# Patient Record
Sex: FEMALE | Race: WHITE | Employment: OTHER | URBAN - METROPOLITAN AREA
[De-identification: names, ages, dates, MRNs, and addresses within clinical notes are randomized per-mention and may not be internally consistent; named-entity substitution may affect disease eponyms.]

---

## 2021-10-25 DIAGNOSIS — E55.9 VITAMIN D DEFICIENCY DISEASE: ICD-10-CM

## 2021-10-25 DIAGNOSIS — E78.5 HYPERLIPIDEMIA, UNSPECIFIED HYPERLIPIDEMIA TYPE: ICD-10-CM

## 2021-10-25 DIAGNOSIS — U07.1 COVID: ICD-10-CM

## 2021-10-25 DIAGNOSIS — I48.91 ATRIAL FIBRILLATION, NEW ONSET (HCC): Primary | ICD-10-CM

## 2021-11-09 DIAGNOSIS — G47.00 INSOMNIA, UNSPECIFIED TYPE: Primary | ICD-10-CM

## 2021-11-09 RX ORDER — ZOLPIDEM TARTRATE 10 MG/1
5 TABLET ORAL NIGHTLY PRN
COMMUNITY

## 2021-12-06 DIAGNOSIS — I48.91 ATRIAL FIBRILLATION, NEW ONSET (HCC): ICD-10-CM

## 2021-12-06 DIAGNOSIS — E78.5 HYPERLIPIDEMIA, UNSPECIFIED HYPERLIPIDEMIA TYPE: ICD-10-CM

## 2021-12-06 DIAGNOSIS — E55.9 VITAMIN D DEFICIENCY DISEASE: ICD-10-CM

## 2021-12-06 DIAGNOSIS — U07.1 COVID: ICD-10-CM

## 2021-12-07 ENCOUNTER — OFFICE VISIT (OUTPATIENT)
Dept: CARDIOLOGY CLINIC | Age: 76
End: 2021-12-07
Payer: MEDICARE

## 2021-12-07 VITALS
SYSTOLIC BLOOD PRESSURE: 150 MMHG | WEIGHT: 151 LBS | DIASTOLIC BLOOD PRESSURE: 80 MMHG | OXYGEN SATURATION: 96 % | HEART RATE: 86 BPM

## 2021-12-07 DIAGNOSIS — E78.5 HYPERLIPIDEMIA, UNSPECIFIED HYPERLIPIDEMIA TYPE: ICD-10-CM

## 2021-12-07 DIAGNOSIS — R09.89 BRUIT: Primary | ICD-10-CM

## 2021-12-07 DIAGNOSIS — I10 HYPERTENSION, UNSPECIFIED TYPE: ICD-10-CM

## 2021-12-07 DIAGNOSIS — E55.9 VITAMIN D DEFICIENCY DISEASE: ICD-10-CM

## 2021-12-07 PROBLEM — E83.39 HYPOPHOSPHATEMIA: Status: ACTIVE | Noted: 2021-10-14

## 2021-12-07 PROBLEM — U07.1 COVID-19: Status: ACTIVE | Noted: 2021-10-08

## 2021-12-07 PROBLEM — I48.91 ATRIAL FIBRILLATION (HCC): Status: ACTIVE | Noted: 2021-10-13

## 2021-12-07 PROBLEM — E87.1 HYPONATREMIA: Status: ACTIVE | Noted: 2021-10-14

## 2021-12-07 PROBLEM — E66.9 OBESITY (BMI 30.0-34.9): Status: ACTIVE | Noted: 2019-02-27

## 2021-12-07 PROCEDURE — G8427 DOCREV CUR MEDS BY ELIG CLIN: HCPCS | Performed by: INTERNAL MEDICINE

## 2021-12-07 PROCEDURE — G8484 FLU IMMUNIZE NO ADMIN: HCPCS | Performed by: INTERNAL MEDICINE

## 2021-12-07 PROCEDURE — 99204 OFFICE O/P NEW MOD 45 MIN: CPT | Performed by: INTERNAL MEDICINE

## 2021-12-07 PROCEDURE — 1036F TOBACCO NON-USER: CPT | Performed by: INTERNAL MEDICINE

## 2021-12-07 PROCEDURE — 4040F PNEUMOC VAC/ADMIN/RCVD: CPT | Performed by: INTERNAL MEDICINE

## 2021-12-07 PROCEDURE — G8400 PT W/DXA NO RESULTS DOC: HCPCS | Performed by: INTERNAL MEDICINE

## 2021-12-07 PROCEDURE — 3017F COLORECTAL CA SCREEN DOC REV: CPT | Performed by: INTERNAL MEDICINE

## 2021-12-07 PROCEDURE — 1123F ACP DISCUSS/DSCN MKR DOCD: CPT | Performed by: INTERNAL MEDICINE

## 2021-12-07 PROCEDURE — 1090F PRES/ABSN URINE INCON ASSESS: CPT | Performed by: INTERNAL MEDICINE

## 2021-12-07 PROCEDURE — G8421 BMI NOT CALCULATED: HCPCS | Performed by: INTERNAL MEDICINE

## 2021-12-07 RX ORDER — OMEGA-3/DHA/EPA/FISH OIL 300-1000MG
CAPSULE ORAL DAILY
COMMUNITY

## 2021-12-07 RX ORDER — ACETAMINOPHEN 500 MG
500 TABLET ORAL EVERY 6 HOURS PRN
COMMUNITY

## 2021-12-07 RX ORDER — MULTIVIT WITH MINERALS/LUTEIN
1000 TABLET ORAL DAILY
COMMUNITY

## 2021-12-07 RX ORDER — METOPROLOL SUCCINATE 25 MG/1
12.5 TABLET, EXTENDED RELEASE ORAL DAILY
COMMUNITY
Start: 2021-11-17

## 2021-12-07 RX ORDER — ROSUVASTATIN CALCIUM 5 MG/1
5 TABLET, COATED ORAL DAILY
Qty: 30 TABLET | Refills: 11 | Status: SHIPPED | OUTPATIENT
Start: 2021-12-07 | End: 2022-05-25 | Stop reason: SINTOL

## 2021-12-07 RX ORDER — ROSUVASTATIN CALCIUM 5 MG/1
5 TABLET, COATED ORAL DAILY
COMMUNITY
End: 2021-12-07 | Stop reason: SDUPTHER

## 2021-12-07 NOTE — PROGRESS NOTES
Ov Dr. Amie Sanders for new pt   Referred by Dr. 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

## 2021-12-07 NOTE — PATIENT INSTRUCTIONS
Start taking Metoprolol 25 mg 1/2 tablet daily     Monitor bp/hr daily-different times of the day   Call in 2 weeks with readings     Will set up for carotid U/S    Will ADD Crestor 5 mg one daily     Will recheck Lipids in three months  (will call you with results)    Follow up in 6 month

## 2021-12-12 NOTE — PROGRESS NOTES
Dwaine Desai M.D. [address] [place] [address] [place] [address]  [phone]          2021          Jacquie Salinas, [address] [facility], [address] [place] [address]      RE:   Andrea Elmore  :  1945      Dear Dr. Sandoval Encinas:    CHIEF COMPLAINT:  Atrial fibrillation. HISTORY OF PRESENT ILLNESS:  I had the pleasure of seeing Mrs. Andrea Elmore in our office on 2021. We take care of her , who had an aortic valve replacement via TAVR procedure. Mrs. Efra Rosario has had no cardiac issues. She had a stress test on 2021, that was negative for ischemia. Echocardiogram also on 2021, looked good with ejection fraction of 65% to 70%. She has not seen a cardiologist.    She does have a long history of severe hyperlipidemia. She was seen in January and in May. She had been tried on pravastatin but had decided quit taking this. She went to the emergency room on 10/13/2021. She was having episodes of dizziness with presyncope. She had been diagnosed with pneumonia one week previously and did test positive for COVID on 10/11/2021. She was receiving monoclonal antibody on 10/15, when she developed lightheadedness and was sent to the ER. In the ER, she was in atrial fibrillation, which was a new onset for the patient. She was admitted and in the morning, she spontaneously converted to sinus rhythm. She had an echocardiogram, which still showed normal LV function, recommendations were for Eliquis 5 mg b.i.d. She was also started on metoprolol. She saw Dr. Jagjit Mclean on 10/25/2021. He agreed, of course, with long-term Eliquis. They then followed up with Dr. Sandoval Encinas on . She stopped metoprolol. She wanted to see me in followup for an opinion. She was given metoprolol 25 mg to take one tablet every Monday, Wednesday and Friday. When I see her, she is distressed that she is taking some medications.   She is taking metoprolol a half of the 50 mg three times a week. Her blood pressure at home was in the 130s. She denies any chest pain or chest discomfort. She has had no unusual shortness of breath. She has had no further lightheaded episodes or near-syncopal episodes. She tries to walk on a regular basis. She has never had a myocardial infarction, never had cardiac catheterization. CARDIAC RISK FACTORS:  Hypertension:  Positive. Severe Hyperlipidemia:  Positive. Other Family Members:  Positive. Peripheral Vascular Disease:  Negative. Diabetes:  Negative. MEDICATIONS:  She is on Tylenol p.r.n., Eliquis 5 mg b.i.d., vitamin B12 daily, vitamin C 1000 mg daily, fish oil 1000 mg daily, Toprol-XL 25 mg half a tablet Monday, Wednesday and Friday (was supposed to take a full tablet on Monday, Wednesday and Friday), multivitamins daily, antihistamines p.r.n., vitamin D daily, Ambien 10 mg p.r.n. for sleep. PAST MEDICAL AND SURGICAL HISTORY:  1. Paroxysmal atrial fibrillation. 2.  Hypophosphatemia. 3.  COVID-19 in October. 4.  Tonsillectomy in 1958. 5.  Meniscus repair on 11/01/2007 and 12/31/2007. 6.  Carpal tunnel release. 7.  Severe hyperlipidemia. FAMILY HISTORY:  Her father had an MI at 76. Mother had an MI at 76. SOCIAL HISTORY:  She is 77years old. . She is a . Has two children. Does not smoke. Does not drink alcohol. REVIEW OF SYSTEMS:  Cardiac as above. Other systems reviewed including constitutional, eyes, ears, nose and throat, cardiovascular, respiratory, GI, , musculoskeletal, integumentary, neurologic, endocrine, hematologic and allergic/immunologic are negative except for what is described above. No weight loss or weight gain. No change in bowel habits, no blood in stool. No fevers, sweats or chills. PHYSICAL EXAMINATION:  VITAL SIGNS:  Her blood pressure was 150/80 with a heart rate of 86 and regular. Respirations were 18. O2 saturation 96%.   Weight 151 pounds. GENERAL:  She is a pleasant 80-year-old female. Denied pain. She was oriented to person, place and time. Answered questions appropriately. SKIN:  No unusual skin changes. HEENT:  The pupils are equally round and intact. Mucous membranes were dry. NECK:  No JVD. Good carotid pulses. She did have a right carotid bruit. No lymphadenopathy or thyromegaly. CARDIOVASCULAR EXAM:  S1 and S2 were normal.  No S3 or S4. She had a grade 3/6 systolic ejection murmur. No diastolic murmur. PMI was normal.  No lift, thrust, or pericardial friction rub. LUNGS:  Quite clear to auscultation and percussion. ABDOMEN:  Soft and nontender. Good bowel sounds. EXTREMITIES:  Good femoral pulses. Good pedal pulses. No pedal edema. Skin was warm and dry. No calf tenderness. Nail beds pink. Good cap refill. PULSES:  Bilateral symmetrical radial, brachial and carotid pulses. Good femoral and pedal pulses. NEUROLOGIC EXAM:  Within normal limits. PSYCHIATRIC EXAM:  Within normal limits. LABORATORY DATA:  Her glucose was 106, BUN 10, creatinine 0.71, sodium 140, potassium 3.9. Her AST was 18, ALT was 16. Calcium 10.5, magnesium 2.3. White count was 5.4, hemoglobin 13.1 with a platelet count of 779,310. Her TSH was 7.235 with a free T4 of slightly low at 0.59. Vitamin D was 56. Cholesterol 290 with an HDL of 54, LDL was 214. Her previous total cholesterol had been in the 250 to 300 range with an LDL in the 190 to 220 range. She had been on pravastatin, which had lowered her LDL precipitously, but she stopped this. EKG showed normal sinus rhythm and nonspecific ST-T changes. Chest x-ray was unremarkable. IMPRESSION:  1. Paroxysmal atrial fibrillation diagnosed on 10/13, when she had lightheadedness and dizziness during an antibody infusion for COVID. 2.  COVID diagnosed in October.   3.  Severe hyperlipidemia with an LDL consistently in the 200 range, currently at 214.  4.  Mild hypothyroidism. 5.  Family history of coronary artery disease, with mother and father both having myocardial infarctions. 6.  Aversion to statins. 7.  Normal LV function. 8.  Right carotid bruit. PLAN:  1. Start taking metoprolol 25 mg a half a tablet daily. 2.  Monitor blood pressure and heart rate daily at different times of the day and call in two weeks with the readings. 3.  Carotid ultrasound for her right carotid bruit. 4.  Take Crestor 5 mg daily and we will recheck lipids in three months and compare the lipids in three months with her current lipids and she can decide whether she wants to continue Crestor. 5.  I will follow up in 6 months. 6.  Long-term Eliquis. DISCUSSION:  Mrs. Herve Bullard does have paroxysmal atrial fibrillation. I did explain atrial fibrillation to her and risk of CVA. I also concurred with your Lopressor 25 mg a half a tablet daily. I did stress the importance of beta-blockade and explained how it worked. I will have her take her blood pressure and heart rate daily to make sure that it is in the 60 to 70 range. She does have a right carotid bruit and we will do a carotid ultrasound. She does have severe hyperlipidemia. You have given her Pravachol, which worked very well but she had stopped this on her own. Her LDL, which had been down to 130, is now back up to 214. She did agree (somewhat. ..) to take Crestor 5 mg daily and recheck a lipid profile in three months. The three months' cholesterol, we will compare with her previous cholesterol and she can make a decision whether to be treated or not be treated for her severe hyperlipidemia. She has no chest pain or chest discomfort to indicate that we need to do another stress test.  She is at high risk with her family history, hyperlipidemia and hypertension. Thank you very much for allowing me the privilege of seeing Mrs. Mena. If you have any questions on my thoughts, please do not hesitate to contact me.

## 2021-12-21 ENCOUNTER — HOSPITAL ENCOUNTER (OUTPATIENT)
Dept: VASCULAR LAB | Age: 76
Discharge: HOME | End: 2021-12-23
Payer: MEDICARE

## 2021-12-21 DIAGNOSIS — R09.89 BRUIT: ICD-10-CM

## 2021-12-21 PROCEDURE — 93880 EXTRACRANIAL BILAT STUDY: CPT

## 2021-12-30 ENCOUNTER — TELEPHONE (OUTPATIENT)
Dept: CARDIOLOGY CLINIC | Age: 76
End: 2021-12-30

## 2022-03-09 ENCOUNTER — TELEPHONE (OUTPATIENT)
Dept: CARDIOLOGY CLINIC | Age: 77
End: 2022-03-09

## 2022-03-09 RX ORDER — LEVOTHYROXINE SODIUM 0.05 MG/1
50 TABLET ORAL DAILY
COMMUNITY

## 2022-05-25 ENCOUNTER — TELEPHONE (OUTPATIENT)
Dept: CARDIOLOGY CLINIC | Age: 77
End: 2022-05-25

## 2022-05-25 RX ORDER — FENOFIBRATE 48 MG/1
48 TABLET, COATED ORAL DAILY
Qty: 30 TABLET | Refills: 3
Start: 2022-05-25

## 2022-05-25 NOTE — TELEPHONE ENCOUNTER
Pt called in seen Dr Pk Castaneda   Pt c/o itching with crestor   He stopped crestor  And put pt on fenofibrate 48 mg

## 2022-05-27 LAB
ALBUMIN SERPL-MCNC: NORMAL G/DL
ALP BLD-CCNC: NORMAL U/L
ALT SERPL-CCNC: NORMAL U/L
ANION GAP SERPL CALCULATED.3IONS-SCNC: NORMAL MMOL/L
AST SERPL-CCNC: NORMAL U/L
BASOPHILS ABSOLUTE: NORMAL
BASOPHILS RELATIVE PERCENT: NORMAL
BILIRUB SERPL-MCNC: NORMAL MG/DL
BUN BLDV-MCNC: NORMAL MG/DL
CALCIUM SERPL-MCNC: NORMAL MG/DL
CHLORIDE BLD-SCNC: NORMAL MMOL/L
CHOLESTEROL, TOTAL: 259 MG/DL
CHOLESTEROL/HDL RATIO: 4.89
CO2: NORMAL
CREAT SERPL-MCNC: 0.83 MG/DL
EOSINOPHILS ABSOLUTE: NORMAL
EOSINOPHILS RELATIVE PERCENT: NORMAL
GFR CALCULATED: NORMAL
GLUCOSE BLD-MCNC: NORMAL MG/DL
HCT VFR BLD CALC: NORMAL %
HDLC SERPL-MCNC: 53 MG/DL (ref 35–70)
HEMOGLOBIN: NORMAL
LDL CHOLESTEROL CALCULATED: 182 MG/DL (ref 0–160)
LYMPHOCYTES ABSOLUTE: NORMAL
LYMPHOCYTES RELATIVE PERCENT: NORMAL
MAGNESIUM: 2.1 MG/DL
MCH RBC QN AUTO: NORMAL PG
MCHC RBC AUTO-ENTMCNC: NORMAL G/DL
MCV RBC AUTO: NORMAL FL
MONOCYTES ABSOLUTE: NORMAL
MONOCYTES RELATIVE PERCENT: NORMAL
NEUTROPHILS ABSOLUTE: NORMAL
NEUTROPHILS RELATIVE PERCENT: NORMAL
NONHDLC SERPL-MCNC: ABNORMAL MG/DL
PDW BLD-RTO: NORMAL %
PLATELET # BLD: NORMAL 10*3/UL
PMV BLD AUTO: NORMAL FL
POTASSIUM SERPL-SCNC: 3.9 MMOL/L
RBC # BLD: NORMAL 10*6/UL
SODIUM BLD-SCNC: NORMAL MMOL/L
TOTAL PROTEIN: NORMAL
TRIGL SERPL-MCNC: 120 MG/DL
TSH SERPL DL<=0.05 MIU/L-ACNC: 2.25 UIU/ML
VITAMIN D 25-HYDROXY: 56.9
VITAMIN D2, 25 HYDROXY: NORMAL
VITAMIN D3,25 HYDROXY: NORMAL
VLDLC SERPL CALC-MCNC: 24 MG/DL
WBC # BLD: NORMAL 10*3/UL

## 2022-06-14 DIAGNOSIS — I10 HYPERTENSION, UNSPECIFIED TYPE: ICD-10-CM

## 2022-06-14 DIAGNOSIS — E55.9 VITAMIN D DEFICIENCY DISEASE: ICD-10-CM

## 2022-06-14 DIAGNOSIS — E78.5 HYPERLIPIDEMIA, UNSPECIFIED HYPERLIPIDEMIA TYPE: ICD-10-CM

## 2022-06-14 DIAGNOSIS — R09.89 BRUIT: ICD-10-CM

## 2022-06-21 ENCOUNTER — OFFICE VISIT (OUTPATIENT)
Dept: CARDIOLOGY CLINIC | Age: 77
End: 2022-06-21
Payer: MEDICARE

## 2022-06-21 VITALS
DIASTOLIC BLOOD PRESSURE: 70 MMHG | OXYGEN SATURATION: 96 % | SYSTOLIC BLOOD PRESSURE: 130 MMHG | WEIGHT: 157 LBS | HEART RATE: 74 BPM

## 2022-06-21 DIAGNOSIS — E78.5 HYPERLIPIDEMIA, UNSPECIFIED HYPERLIPIDEMIA TYPE: Primary | ICD-10-CM

## 2022-06-21 DIAGNOSIS — I48.91 ATRIAL FIBRILLATION, NEW ONSET (HCC): ICD-10-CM

## 2022-06-21 DIAGNOSIS — I10 HYPERTENSION, UNSPECIFIED TYPE: ICD-10-CM

## 2022-06-21 DIAGNOSIS — E55.9 VITAMIN D DEFICIENCY DISEASE: ICD-10-CM

## 2022-06-21 PROCEDURE — G8427 DOCREV CUR MEDS BY ELIG CLIN: HCPCS | Performed by: INTERNAL MEDICINE

## 2022-06-21 PROCEDURE — 1036F TOBACCO NON-USER: CPT | Performed by: INTERNAL MEDICINE

## 2022-06-21 PROCEDURE — 99214 OFFICE O/P EST MOD 30 MIN: CPT | Performed by: INTERNAL MEDICINE

## 2022-06-21 PROCEDURE — G8421 BMI NOT CALCULATED: HCPCS | Performed by: INTERNAL MEDICINE

## 2022-06-21 PROCEDURE — 1090F PRES/ABSN URINE INCON ASSESS: CPT | Performed by: INTERNAL MEDICINE

## 2022-06-21 PROCEDURE — G8400 PT W/DXA NO RESULTS DOC: HCPCS | Performed by: INTERNAL MEDICINE

## 2022-06-21 PROCEDURE — 1123F ACP DISCUSS/DSCN MKR DOCD: CPT | Performed by: INTERNAL MEDICINE

## 2022-06-21 NOTE — PROGRESS NOTES
Ov DR Aylin Max 6 mth follow up   No sob  No chest pain   No palpitation  Seeing Dr Jade Dietz thyroid nodule  Now on synthroid. C/o itching with crestor   So stopped   And put on tricor. No hospitalizations or procedures  Since seen. Getting cortisone inj in knees. Bedside echo done. Encouraged to try red yeast   Rice 3 tabs. Daily      Pt offered also to see cardiologist   In [facility] closer to home   Will do 1 year for now.

## 2022-06-23 NOTE — PROGRESS NOTES
has had no hospitalizations or procedures. She is getting cortisone injections in her knees for severe arthritis. She has never had a myocardial infarction, never had a cardiac catheterization. CARDIAC RISK FACTORS:  Hypertension:  Positive. Severe Hyperlipidemia:  Positive. Other Family Members:  Positive. Peripheral Vascular Disease:  Negative. Diabetes:  Negative. MEDICATIONS AT THIS TIME:  She is on Eliquis 5 mg b.i.d., cyanocobalamin 2500 mcg daily, TriCor 48 mg daily, fish oil 1000 mg daily, Synthroid 50 mcg daily Saturday and Sunday, takes one and a half tablets, Toprol-XL 25 mg half a tablet daily, vitamin D 5000 units daily and Ambien 10 mg p.r.n. PAST MEDICAL AND SURGICAL HISTORY:  1. Paroxysmal atrial fibrillation. 2.  COVID-19 in 10/2021.  4.  Tonsillectomy in 1958. 5.  Meniscus repair on 11/01/2007 and 12/31/2007. 6.  Carpal tunnel release. 7.  Severe hyperlipidemia, with an untreated LDL in the 180 range. FAMILY HISTORY:  Father had an MI at 76. Mother had an MI at 76. SOCIAL HISTORY:  She is 69years old, . She is a . Has two children. Does not smoke or drink alcohol. Remains active. They live [facility]. REVIEW OF SYSTEMS:  Cardiac as above. Other systems reviewed including constitutional, eyes, ears, nose and throat, cardiovascular, respiratory, GI, , musculoskeletal, integumentary, neurologic, endocrine, hematologic and allergic/immunologic are negative except for what is described above. No weight loss or weight gain. No change in bowel habits, no blood in stool. No fevers, sweats or chills. PHYSICAL EXAMINATION:  VITAL SIGNS:  Her blood pressure was 130/70 with a heart rate of 74 and regular. Respirations 18. O2 sat 96%. Weight 157 pounds. GENERAL:  She is a very pleasant 36-year-old female. Denied pain. She was oriented to person, place and time. Answered questions appropriately.   SKIN:  No unusual skin changes. HEENT:  The pupils are equally round and intact. Mucous membranes were dry. NECK:  No JVD. Good carotid pulses. No carotid bruits. No lymphadenopathy or thyromegaly. She had transmitted aortic murmur to both carotid arteries. CARDIOVASCULAR EXAM:  S1 and S2 were normal.  No S3 or S4. She did have a grade 3/6 systolic ejection murmur. No diastolic murmur. PMI was normal.  No lift, thrust, or pericardial friction rub. LUNGS:  Clear to auscultation and percussion. ABDOMEN:  Soft and nontender. Good bowel sounds. EXTREMITIES:  Good femoral pulses. Good pedal pulses. No pedal edema. Skin was warm and dry. No calf tenderness. Nail beds pink. Good cap refill. PULSES:  Bilateral symmetrical radial, brachial and carotid pulses. No carotid bruits. Good femoral and pedal pulses. NEUROLOGIC EXAM:  Within normal limits. PSYCHIATRIC EXAM:  Within normal limits. LABORATORY DATA:  Her glucose was 106, BUN 16, creatinine 0.83. Sodium was 138, potassium 3.9. AST was 19, ALT was 17.  Calcium 10.4, magnesium 2.1. Vitamin D was 56.9.  TSH was normal at 2.245. White count was 5.4, hemoglobin 13.5 with a platelet count of 080,380. Cholesterol was 259 with triglycerides 120., her HDL was 53, LDL was 182. EKG showed normal sinus rhythm with nonspecific ST changes. IMPRESSION:  1. Asymptomatic with no chest pain or chest discomfort or shortness of breath or loss of energy. 2.  Severe hyperlipidemia with LDL in the 180 range, untreated mainly by her choice, although did develop a rash on Crestor. 3.  Paroxysmal atrial fibrillation, on Toprol-XL 25 mg half a tablet daily as well as Eliquis 5 mg b.i.d.  4.  COVID diagnosed in 10/2021.  5.  Mild hypothyroidism, euthyroid on treatment. 6.  Family history of coronary artery disease. 7.  Normal LV function.   8.  Transmitted aortic murmur to both carotid arteries, with mild plaque disease in her carotid arteries in 11/2021.  9.  No significant aortic stenosis on an echocardiogram with normal LV function. PLAN:  1. Have her try red yeast rice with three tablets daily. 2.  I have offered for her to see Dr. Hemal Gonzalez and if needed, another cardiologist closer to [facility] where she lives. 3.  We have offered her one-year appointment and let her decide whether or not she sees us or another cardiologist closer to home. DISCUSSION:  Mrs. Sera Montalvo overall is doing well. She has had no chest pain or chest discomfort or any unusual shortness of breath. She is at a high risk for coronary artery disease with her untreated LDL. I have asked her to buy some red yeast rice and try that three tablets daily and do another lipid profile in three months. Her EKG is abnormal, but she has no symptoms to indicate that we need to do a Lexiscan stress test.    I made no change in medications except to add the red yeast rice. Again, I have offered for her to see somebody closer in [facility] versus coming to see me in a year. She and her  will decide later. Thank you very much for allowing me the privilege of seeing Mrs. Shashi Unger. If you have any questions on my thoughts, please do not hesitate to contact me.     Sincerely,        Tamica Fernandez    D: 06/21/2022 12:30:31     T: 06/21/2022 12:35:31     [email or web address]  Job#: [id number]   Doc#: [id number]

## 2022-08-15 ENCOUNTER — TELEPHONE (OUTPATIENT)
Dept: CARDIOLOGY CLINIC | Age: 77
End: 2022-08-15

## 2022-08-15 NOTE — TELEPHONE ENCOUNTER
Per DR Donte Bah to hold eliquis for 3 days prior   [facility] to call office. Pt called informed its ok to stop eliquis 3 days prior.

## 2022-08-15 NOTE — TELEPHONE ENCOUNTER
Payalsalvadorlatasha Shepard left a message to ask if she needs to stop Eliquis prior to a kidney stone procedure. She stated that her procedure is on Aug 23rd. I tried to return the call to get more info, but no answer. On the message, she stated that the performing doctor is not asking her to stop it, she is just concerned and wanted to make sure with Dr. Delbert Hyman.     Please advise

## 2022-08-17 ENCOUNTER — TELEPHONE (OUTPATIENT)
Dept: CARDIOLOGY CLINIC | Age: 77
End: 2022-08-17

## 2022-08-17 DIAGNOSIS — R94.31 ABNORMAL EKG: Primary | ICD-10-CM

## 2022-08-17 NOTE — TELEPHONE ENCOUNTER
Radha Gonzalez called to state that she was in [facility] having her pre op for the kidney stone procedure. She is just having kidney stone blasting on 8/23. She stated that her EKG done on Monday was just a little abnormal and the anesthesiologist  told her she has to have a stress test prior to her procedure on 8/23. She stated that Dr. Raulito Wisdom wants Dr. Kwan Parrish to order it. She is willing to come here for the stress test.    She stated that she told them she was nauseated the day before and she felt it was low blood sugar and not related to her heart. Dr. Barrett Said or Henrry Mckinney  [phone] Phone    She is also asking if she needs to stop Eliquis prior to the procedure. Sent for EKG    Please advise. Slade Prieto

## 2022-08-17 NOTE — TELEPHONE ENCOUNTER
Dr greg Michaud  pt was informed dr out next week will not be able to clear pt in time for her surgery. Anesthesia called in also informed them dr Shaun Coronado next week they are aware may need to postpone surgery. Call and talked with Nahum Lynch informed of stress test date  Fax # [phone] when we are able to clear pt.

## 2022-08-30 ENCOUNTER — HOSPITAL ENCOUNTER (OUTPATIENT)
Dept: NUCLEAR MEDICINE | Age: 77
Discharge: HOME | End: 2022-09-01
Payer: MEDICARE

## 2022-08-30 ENCOUNTER — HOSPITAL ENCOUNTER (OUTPATIENT)
Dept: NON INVASIVE DIAGNOSTICS | Age: 77
Discharge: HOME | End: 2022-08-30
Payer: MEDICARE

## 2022-08-30 VITALS — SYSTOLIC BLOOD PRESSURE: 150 MMHG | HEART RATE: 97 BPM | DIASTOLIC BLOOD PRESSURE: 72 MMHG

## 2022-08-30 DIAGNOSIS — R94.31 ABNORMAL EKG: ICD-10-CM

## 2022-08-30 PROCEDURE — A9500 TC99M SESTAMIBI: HCPCS | Performed by: INTERNAL MEDICINE

## 2022-08-30 PROCEDURE — 3430000000 HC RX DIAGNOSTIC RADIOPHARMACEUTICAL: Performed by: INTERNAL MEDICINE

## 2022-08-30 PROCEDURE — 78452 HT MUSCLE IMAGE SPECT MULT: CPT | Performed by: INTERNAL MEDICINE

## 2022-08-30 PROCEDURE — 78452 HT MUSCLE IMAGE SPECT MULT: CPT

## 2022-08-30 PROCEDURE — 2580000003 HC RX 258: Performed by: INTERNAL MEDICINE

## 2022-08-30 PROCEDURE — 93017 CV STRESS TEST TRACING ONLY: CPT

## 2022-08-30 PROCEDURE — 6360000002 HC RX W HCPCS: Performed by: INTERNAL MEDICINE

## 2022-08-30 RX ORDER — SODIUM CHLORIDE 0.9 % (FLUSH) 0.9 %
10 SYRINGE (ML) INJECTION PRN
Status: DISCONTINUED | OUTPATIENT
Start: 2022-08-30 | End: 2022-08-31 | Stop reason: HOSPADM

## 2022-08-30 RX ORDER — AMINOPHYLLINE DIHYDRATE 25 MG/ML
50 INJECTION, SOLUTION INTRAVENOUS
Status: DISCONTINUED | OUTPATIENT
Start: 2022-08-30 | End: 2022-08-30 | Stop reason: HOSPADM

## 2022-08-30 RX ORDER — TAMSULOSIN HYDROCHLORIDE 0.4 MG/1
0.4 CAPSULE ORAL DAILY
COMMUNITY
Start: 2022-08-11 | End: 2022-09-10

## 2022-08-30 RX ORDER — AMINOPHYLLINE DIHYDRATE 25 MG/ML
100 INJECTION, SOLUTION INTRAVENOUS
Status: DISCONTINUED | OUTPATIENT
Start: 2022-08-30 | End: 2022-08-30 | Stop reason: HOSPADM

## 2022-08-30 RX ADMIN — TETRAKIS(2-METHOXYISOBUTYLISOCYANIDE)COPPER(I) TETRAFLUOROBORATE 30 MILLICURIE: 1 INJECTION, POWDER, LYOPHILIZED, FOR SOLUTION INTRAVENOUS at 12:06

## 2022-08-30 RX ADMIN — REGADENOSON 0.4 MG: 0.08 INJECTION, SOLUTION INTRAVENOUS at 10:26

## 2022-08-30 RX ADMIN — SODIUM CHLORIDE, PRESERVATIVE FREE 10 ML: 5 INJECTION INTRAVENOUS at 10:26

## 2022-08-30 RX ADMIN — TETRAKIS(2-METHOXYISOBUTYLISOCYANIDE)COPPER(I) TETRAFLUOROBORATE 10 MILLICURIE: 1 INJECTION, POWDER, LYOPHILIZED, FOR SOLUTION INTRAVENOUS at 09:04

## 2022-08-30 NOTE — PROCEDURES
[place] [address]                              CARDIAC STRESS TEST    PATIENT NAME: Monica Gonzalez                        :        1945  MED REC NO:   [id number]                              ROOM:  ACCOUNT NO:   [de-identified]                           ADMIT DATE: 2022  PROVIDER:     Félix Sanchez MD    DATE OF STUDY:  2022    Cardiovascular Diagnostics Department    Ordering Provider:  Payton Torres MD    Primary Care Provider:  Brigida Gowers, MD    Interpreting Physician:  Félix Sanchez MD    MYOCARDIAL PERFUSION STRESS IMAGING    The stress ECG results are reported separately. NUCLEAR IMAGING RESULTS:  The overall quality of the study is fair. No  significant attenuation artifact was seen. There is no evidence of  abnormal lung uptake. Additionally, the right ventricle appears normal.  The left ventricular cavity is noted to be normal in size on stress  images. There is no evidence of transient ischemic dilatation (TID) of  the left ventricle. Gated SPECT imaging reveals normal myocardial thickening and wall motion  with a calculated left ventricular ejection fraction (EF) of 76%. The rest images demonstrate homogenous tracer distribution throughout  the myocardium. SPECT images demonstrate homogenous tracer distribution throughout the  myocardium. IMPRESSION:  1. Normal myocardial perfusion imaging without evidence of significant  myocardial ischemia or infarction. 2.  Global left ventricular systolic function was normal with an EF of  76% without regional wall motion abnormalities. Overall these results are most consistent with a low risk scan. The results of this test were discussed with Dr. Gabrielle Almaraz on 2022  at 66 [phone] 21.         Omari Morris MD    D: 2022 15:30:58       T: 2022 15:32:11     [email or web address]  Job#: [id number]     Doc#: Unknown    CC:   [id number] [facility]

## 2022-08-30 NOTE — PROGRESS NOTES
Lexiscan administered. 10:28 Pt denies shortness of breath or chest pain. 10:33 Pt tolerated test well. This part of test completed, pt given snack and beverage.

## 2022-08-31 ENCOUNTER — TELEPHONE (OUTPATIENT)
Dept: CARDIOLOGY CLINIC | Age: 77
End: 2022-08-31

## 2022-08-31 PROCEDURE — 93018 CV STRESS TEST I&R ONLY: CPT | Performed by: INTERNAL MEDICINE

## 2022-08-31 PROCEDURE — 93016 CV STRESS TEST SUPVJ ONLY: CPT | Performed by: INTERNAL MEDICINE

## 2022-08-31 NOTE — PROCEDURES
[place] [address]                              CARDIAC STRESS TEST    PATIENT NAME: Lori Subramanian                        :        1945  MED REC NO:   [id number]                              ROOM:  ACCOUNT NO:   [de-identified]                           ADMIT DATE: 2022  PROVIDER:     Mary Still      DATE OF STUDY:  2022    LEXISCAN CARDIOLITE STRESS TEST:    INDICATION:  Chest pain. IMPRESSION:  1. We gave 0.4 mg of Lexiscan intravenously. 2.  This was followed in 20 seconds by Cardiolite infusion. 3.  There was no chest pain. 4.  There was no ST depression. 5.  It was an overall negative Lexiscan stress test.  6.  Cardiolite to follow. Gabby Llanos    D: 2022 7:53:48       T: 2022 9:46:03     [email or web address]  Job#: [id number]     Doc#: [id number]    CC:   Elba Oneill

## 2022-08-31 NOTE — TELEPHONE ENCOUNTER
Letter faxed for cardiac clearance after stress test done  Called DR Archuleta office left message that pt is cleared.